# Patient Record
Sex: FEMALE
[De-identification: names, ages, dates, MRNs, and addresses within clinical notes are randomized per-mention and may not be internally consistent; named-entity substitution may affect disease eponyms.]

---

## 2020-08-20 ENCOUNTER — APPOINTMENT (OUTPATIENT)
Dept: RHEUMATOLOGY | Facility: CLINIC | Age: 55
End: 2020-08-20
Payer: COMMERCIAL

## 2020-08-20 VITALS
DIASTOLIC BLOOD PRESSURE: 64 MMHG | HEART RATE: 65 BPM | OXYGEN SATURATION: 99 % | SYSTOLIC BLOOD PRESSURE: 107 MMHG | TEMPERATURE: 99 F | HEIGHT: 65 IN | WEIGHT: 117 LBS | BODY MASS INDEX: 19.49 KG/M2

## 2020-08-20 PROCEDURE — 99203 OFFICE O/P NEW LOW 30 MIN: CPT

## 2020-08-20 NOTE — ASSESSMENT
[FreeTextEntry1] : 54 year old female presents today for an initial evaluation. Has 6 month hx of diffuse polyarthralgias, fatigue, poor sleep. Denies swelling to the joints. Symptoms worse with activity, at the end of the day. Not currently taking any medications for pain. Labs unremarkable. ROS and PE otherwise unremarkable for underlying classic CTD/ systemic autoimmune disease\par \par -NSAIDS/Tylenol prn for pain (reviewed risk and benefits of medications)\par -OTC topical analgesics\par -encouraged exercise and weight loss\par \par Discussed treatment plan with the patient. The patient was given the opportunity to ask questions and all questions were answered to their satisfaction.

## 2020-08-20 NOTE — PHYSICAL EXAM
[General Appearance - Alert] : alert [General Appearance - In No Acute Distress] : in no acute distress [General Appearance - Well Nourished] : well nourished [General Appearance - Well Developed] : well developed [PERRL With Normal Accommodation] : pupils were equal in size, round, and reactive to light [Sclera] : the sclera and conjunctiva were normal [Examination Of The Oral Cavity] : the lips and gums were normal [Extraocular Movements] : extraocular movements were intact [Outer Ear] : the ears and nose were normal in appearance [Oropharynx] : the oropharynx was normal [Neck Appearance] : the appearance of the neck was normal [Jugular Venous Distention Increased] : there was no jugular-venous distention [Heart Rate And Rhythm] : heart rate was normal and rhythm regular [Respiration, Rhythm And Depth] : normal respiratory rhythm and effort [Auscultation Breath Sounds / Voice Sounds] : lungs were clear to auscultation bilaterally [Heart Sounds] : normal S1 and S2 [Heart Sounds Gallop] : no gallops [Heart Sounds Pericardial Friction Rub] : no pericardial rub [Murmurs] : no murmurs [Bowel Sounds] : normal bowel sounds [Abdomen Soft] : soft [Abdomen Tenderness] : non-tender [No CVA Tenderness] : no ~M costovertebral angle tenderness [Abnormal Walk] : normal gait [Nail Clubbing] : no clubbing  or cyanosis of the fingernails [No Spinal Tenderness] : no spinal tenderness [Involuntary Movements] : no involuntary movements were seen [Motor Tone] : muscle strength and tone were normal [Musculoskeletal - Swelling] : no joint swelling seen [] : no rash [Skin Color & Pigmentation] : normal skin color and pigmentation [Skin Lesions] : no skin lesions [Sensation] : the sensory exam was normal to light touch and pinprick [Motor Exam] : the motor exam was normal [No Focal Deficits] : no focal deficits [Impaired Insight] : insight and judgment were intact [Oriented To Time, Place, And Person] : oriented to person, place, and time [Affect] : the affect was normal [Mood] : the mood was normal

## 2020-08-20 NOTE — HISTORY OF PRESENT ILLNESS
[FreeTextEntry1] : 54 year old female with PMH as listed below presents today for an initial evaluation\par \par Has 6 month hx of diffuse polyarthralgias, fatigue, poor sleep. Denies swelling to the joints. Symptoms described as dull/ aching sensation that starts in the morning and last the whole day. Symptoms worse with activity, at the end of the day. Not currently taking any medications for pain. \par \par Following neurology for migraine headaches. +raynauds to BL hands and feet\par Labs with LULU: negative, esr: 6, TSH/cbc/cmp: WNL\par \par denies fever, chills, weight loss, weight gain, denies dry eye,eye pain, eye redness, vision changes, dry mouth, oral ulcers, nasal congestion, difficulty swallowing,  denies ear pain, hearing changes, denies chest pain, chest palpitations, denies sob, blood in stool, denies dysuria, blood in the urine,  denies rashes, photosensitivity, hair loss, skin thickening, denies blood clots

## 2020-11-16 ENCOUNTER — APPOINTMENT (OUTPATIENT)
Dept: ENDOCRINOLOGY | Facility: CLINIC | Age: 55
End: 2020-11-16

## 2020-11-19 ENCOUNTER — APPOINTMENT (OUTPATIENT)
Dept: RHEUMATOLOGY | Facility: CLINIC | Age: 55
End: 2020-11-19
Payer: COMMERCIAL

## 2020-11-19 VITALS
SYSTOLIC BLOOD PRESSURE: 106 MMHG | DIASTOLIC BLOOD PRESSURE: 67 MMHG | BODY MASS INDEX: 19.49 KG/M2 | HEART RATE: 66 BPM | TEMPERATURE: 98.6 F | WEIGHT: 117 LBS | RESPIRATION RATE: 14 BRPM | OXYGEN SATURATION: 99 % | HEIGHT: 65 IN

## 2020-11-19 DIAGNOSIS — M79.10 MYALGIA, UNSPECIFIED SITE: ICD-10-CM

## 2020-11-19 DIAGNOSIS — R53.82 CHRONIC FATIGUE, UNSPECIFIED: ICD-10-CM

## 2020-11-19 DIAGNOSIS — M25.50 PAIN IN UNSPECIFIED JOINT: ICD-10-CM

## 2020-11-19 DIAGNOSIS — Z72.820 SLEEP DEPRIVATION: ICD-10-CM

## 2020-11-19 PROCEDURE — 99214 OFFICE O/P EST MOD 30 MIN: CPT

## 2020-11-19 RX ORDER — CYCLOBENZAPRINE HYDROCHLORIDE 5 MG/1
5 TABLET, FILM COATED ORAL
Qty: 30 | Refills: 1 | Status: ACTIVE | COMMUNITY
Start: 2020-11-19 | End: 1900-01-01

## 2020-11-19 NOTE — ASSESSMENT
[FreeTextEntry1] : 55 year old female presents today for an f.u visit. Has hx of diffuse polyarthralgias, myalgia, fatigue, poor sleep. Denies swelling to the joints. Symptoms worse with activity, at the end of the day. Labs unremarkable. ROS and PE otherwise unremarkable for underlying classic CTD/ systemic autoimmune disease.\par \par -will follow up on repeat pending labs \par -trail with muscle relaxer\par -NSAIDS/Tylenol prn for pain \par -OTC topical analgesics\par -encouraged proper diet, good sleep hygiene, exercise \par \par Discussed treatment plan with the patient. The patient was given the opportunity to ask questions and all questions were answered to their satisfaction.

## 2020-11-19 NOTE — HISTORY OF PRESENT ILLNESS
[FreeTextEntry1] : Pt presenting today for a f.u visit. Now reports to have increased myalgia, fatigue, diffuse polyarthralgia, poor sleep. Taking NSAIDS prn with some improvement in symptoms. \par Labs pending

## 2020-11-19 NOTE — PHYSICAL EXAM
[General Appearance - Alert] : alert [General Appearance - In No Acute Distress] : in no acute distress [General Appearance - Well Nourished] : well nourished [General Appearance - Well Developed] : well developed [Sclera] : the sclera and conjunctiva were normal [PERRL With Normal Accommodation] : pupils were equal in size, round, and reactive to light [Extraocular Movements] : extraocular movements were intact [Outer Ear] : the ears and nose were normal in appearance [Examination Of The Oral Cavity] : the lips and gums were normal [Oropharynx] : the oropharynx was normal [Neck Appearance] : the appearance of the neck was normal [Jugular Venous Distention Increased] : there was no jugular-venous distention [Respiration, Rhythm And Depth] : normal respiratory rhythm and effort [Auscultation Breath Sounds / Voice Sounds] : lungs were clear to auscultation bilaterally [Heart Rate And Rhythm] : heart rate was normal and rhythm regular [Heart Sounds] : normal S1 and S2 [Heart Sounds Gallop] : no gallops [Murmurs] : no murmurs [Heart Sounds Pericardial Friction Rub] : no pericardial rub [Bowel Sounds] : normal bowel sounds [Abdomen Soft] : soft [Abdomen Tenderness] : non-tender [No CVA Tenderness] : no ~M costovertebral angle tenderness [No Spinal Tenderness] : no spinal tenderness [Abnormal Walk] : normal gait [Nail Clubbing] : no clubbing  or cyanosis of the fingernails [Involuntary Movements] : no involuntary movements were seen [Musculoskeletal - Swelling] : no joint swelling seen [Motor Tone] : muscle strength and tone were normal [Skin Color & Pigmentation] : normal skin color and pigmentation [] : no rash [Skin Lesions] : no skin lesions [Sensation] : the sensory exam was normal to light touch and pinprick [Motor Exam] : the motor exam was normal [No Focal Deficits] : no focal deficits [Oriented To Time, Place, And Person] : oriented to person, place, and time [Impaired Insight] : insight and judgment were intact [Affect] : the affect was normal [Mood] : the mood was normal [FreeTextEntry1] : +diffuse paraspinal tenderness

## 2020-12-02 RX ORDER — DULOXETINE HYDROCHLORIDE 20 MG/1
20 CAPSULE, DELAYED RELEASE PELLETS ORAL
Qty: 90 | Refills: 0 | Status: DISCONTINUED | COMMUNITY
Start: 2020-09-01 | End: 2020-12-02

## 2020-12-02 RX ORDER — AMITRIPTYLINE HYDROCHLORIDE 10 MG/1
10 TABLET, FILM COATED ORAL
Qty: 30 | Refills: 2 | Status: ACTIVE | COMMUNITY
Start: 2020-12-02 | End: 1900-01-01

## 2022-09-06 ENCOUNTER — OFFICE (OUTPATIENT)
Dept: URBAN - METROPOLITAN AREA CLINIC 109 | Facility: CLINIC | Age: 57
Setting detail: OPHTHALMOLOGY
End: 2022-09-06
Payer: COMMERCIAL

## 2022-09-06 DIAGNOSIS — H25.13: ICD-10-CM

## 2022-09-06 DIAGNOSIS — H40.033: ICD-10-CM

## 2022-09-06 DIAGNOSIS — H52.4: ICD-10-CM

## 2022-09-06 PROBLEM — H52.7 REFRACTIVE ERROR: Status: ACTIVE | Noted: 2022-09-06

## 2022-09-06 PROCEDURE — 92004 COMPRE OPH EXAM NEW PT 1/>: CPT | Performed by: OPTOMETRIST

## 2022-09-06 PROCEDURE — 92015 DETERMINE REFRACTIVE STATE: CPT | Performed by: OPTOMETRIST

## 2022-09-06 ASSESSMENT — REFRACTION_CURRENTRX
OD_SPHERE: +0.25
OS_OVR_VA: 20/
OD_CYLINDER: -0.25
OD_SPHERE: +1.50
OS_SPHERE: +1.25
OS_AXIS: 81
OS_CYLINDER: -0.50
OS_OVR_VA: 20/
OD_OVR_VA: 20/
OD_AXIS: 89
OS_AXIS: 86
OD_CYLINDER: -0.50
OD_OVR_VA: 20/
OS_SPHERE: +0.25
OS_CYLINDER: -0.50
OD_AXIS: 93

## 2022-09-06 ASSESSMENT — TONOMETRY
OS_IOP_MMHG: 15
OD_IOP_MMHG: 15

## 2022-09-06 ASSESSMENT — REFRACTION_MANIFEST
OD_CYLINDER: SPH
OS_ADD: +2.25
OD_ADD: +2.25
OD_SPHERE: +0.25
OS_SPHERE: PLANO
OU_VA: 20/20
OS_VA1: 20/20
OD_VA1: 20/20

## 2022-09-06 ASSESSMENT — REFRACTION_AUTOREFRACTION
OS_CYLINDER: -0.25
OD_CYLINDER: -0.25
OD_SPHERE: +0.50
OS_SPHERE: +0.25
OS_AXIS: 106
OD_AXIS: 95

## 2022-09-06 ASSESSMENT — VISUAL ACUITY
OS_BCVA: 20/20
OD_BCVA: 20/20-1

## 2022-09-06 ASSESSMENT — CONFRONTATIONAL VISUAL FIELD TEST (CVF)
OD_FINDINGS: FULL
OS_FINDINGS: FULL

## 2022-09-06 ASSESSMENT — SPHEQUIV_DERIVED
OD_SPHEQUIV: 0.375
OS_SPHEQUIV: 0.125

## 2023-06-30 ENCOUNTER — OFFICE (OUTPATIENT)
Dept: URBAN - METROPOLITAN AREA CLINIC 109 | Facility: CLINIC | Age: 58
Setting detail: OPHTHALMOLOGY
End: 2023-06-30
Payer: COMMERCIAL

## 2023-06-30 DIAGNOSIS — H52.7: ICD-10-CM

## 2023-06-30 DIAGNOSIS — H25.13: ICD-10-CM

## 2023-06-30 DIAGNOSIS — H40.033: ICD-10-CM

## 2023-06-30 PROCEDURE — 99213 OFFICE O/P EST LOW 20 MIN: CPT | Performed by: OPHTHALMOLOGY

## 2023-06-30 PROCEDURE — 92020 GONIOSCOPY: CPT | Performed by: OPHTHALMOLOGY

## 2023-06-30 ASSESSMENT — REFRACTION_CURRENTRX
OS_AXIS: 81
OS_CYLINDER: -0.50
OD_OVR_VA: 20/
OS_AXIS: 86
OS_SPHERE: +1.25
OD_SPHERE: +0.25
OD_AXIS: 89
OD_AXIS: 93
OS_SPHERE: +0.25
OD_CYLINDER: -0.25
OS_OVR_VA: 20/
OD_CYLINDER: -0.50
OS_CYLINDER: -0.50
OS_OVR_VA: 20/
OD_OVR_VA: 20/
OD_SPHERE: +1.50

## 2023-06-30 ASSESSMENT — REFRACTION_MANIFEST
OS_VA1: 20/20
OS_ADD: +2.25
OU_VA: 20/20
OD_SPHERE: +0.25
OD_VA1: 20/20
OD_CYLINDER: SPH
OD_ADD: +2.25
OS_SPHERE: PLANO

## 2023-06-30 ASSESSMENT — VISUAL ACUITY
OD_BCVA: 20/20
OS_BCVA: 20/20-1

## 2023-06-30 ASSESSMENT — TONOMETRY
OD_IOP_MMHG: 12
OS_IOP_MMHG: 12

## 2023-06-30 ASSESSMENT — REFRACTION_AUTOREFRACTION
OD_AXIS: 95
OD_CYLINDER: -0.25
OS_AXIS: 106
OD_SPHERE: +0.50
OS_CYLINDER: -0.25
OS_SPHERE: +0.25

## 2023-06-30 ASSESSMENT — CONFRONTATIONAL VISUAL FIELD TEST (CVF)
OD_FINDINGS: FULL
OS_FINDINGS: FULL

## 2023-06-30 ASSESSMENT — SPHEQUIV_DERIVED
OS_SPHEQUIV: 0.125
OD_SPHEQUIV: 0.375

## 2023-07-28 ENCOUNTER — OFFICE (OUTPATIENT)
Dept: URBAN - METROPOLITAN AREA CLINIC 109 | Facility: CLINIC | Age: 58
Setting detail: OPHTHALMOLOGY
End: 2023-07-28
Payer: COMMERCIAL

## 2023-07-28 DIAGNOSIS — H02.832: ICD-10-CM

## 2023-07-28 DIAGNOSIS — H40.033: ICD-10-CM

## 2023-07-28 DIAGNOSIS — H02.835: ICD-10-CM

## 2023-07-28 DIAGNOSIS — H02.834: ICD-10-CM

## 2023-07-28 DIAGNOSIS — H52.7: ICD-10-CM

## 2023-07-28 DIAGNOSIS — H02.831: ICD-10-CM

## 2023-07-28 DIAGNOSIS — H25.13: ICD-10-CM

## 2023-07-28 PROCEDURE — 92014 COMPRE OPH EXAM EST PT 1/>: CPT | Performed by: OPHTHALMOLOGY

## 2023-07-28 PROCEDURE — 92020 GONIOSCOPY: CPT | Performed by: OPHTHALMOLOGY

## 2023-07-28 ASSESSMENT — REFRACTION_CURRENTRX
OD_AXIS: 89
OD_CYLINDER: -0.50
OD_OVR_VA: 20/
OD_CYLINDER: -0.25
OS_OVR_VA: 20/
OS_OVR_VA: 20/
OS_AXIS: 86
OD_OVR_VA: 20/
OD_AXIS: 93
OS_SPHERE: +1.25
OS_SPHERE: +0.25
OD_SPHERE: +1.50
OD_SPHERE: +0.25
OS_CYLINDER: -0.50
OS_AXIS: 81
OS_CYLINDER: -0.50

## 2023-07-28 ASSESSMENT — SPHEQUIV_DERIVED
OD_SPHEQUIV: 0.375
OS_SPHEQUIV: 0.125

## 2023-07-28 ASSESSMENT — REFRACTION_AUTOREFRACTION
OS_SPHERE: +0.25
OD_AXIS: 95
OS_AXIS: 106
OD_SPHERE: +0.50
OS_CYLINDER: -0.25
OD_CYLINDER: -0.25

## 2023-07-28 ASSESSMENT — REFRACTION_MANIFEST
OU_VA: 20/20
OS_SPHERE: PLANO
OD_VA1: 20/20
OD_SPHERE: +0.25
OD_CYLINDER: SPH
OS_ADD: +2.25
OS_VA1: 20/20
OD_ADD: +2.25

## 2023-07-28 ASSESSMENT — TONOMETRY
OD_IOP_MMHG: 17
OS_IOP_MMHG: 17

## 2023-07-28 ASSESSMENT — VISUAL ACUITY
OS_BCVA: 20/20-1
OD_BCVA: 20/20

## 2023-07-28 ASSESSMENT — CONFRONTATIONAL VISUAL FIELD TEST (CVF)
OD_FINDINGS: FULL
OS_FINDINGS: FULL

## 2023-07-28 ASSESSMENT — LID POSITION - DERMATOCHALASIS
OS_DERMATOCHALASIS: LLL LUL 1+
OD_DERMATOCHALASIS: RLL RUL 1+

## 2024-07-19 ENCOUNTER — OFFICE (OUTPATIENT)
Dept: URBAN - METROPOLITAN AREA CLINIC 109 | Facility: CLINIC | Age: 59
Setting detail: OPHTHALMOLOGY
End: 2024-07-19
Payer: COMMERCIAL

## 2024-07-19 ENCOUNTER — RX ONLY (RX ONLY)
Age: 59
End: 2024-07-19

## 2024-07-19 DIAGNOSIS — H00.025: ICD-10-CM

## 2024-07-19 DIAGNOSIS — H02.834: ICD-10-CM

## 2024-07-19 DIAGNOSIS — H02.831: ICD-10-CM

## 2024-07-19 DIAGNOSIS — H02.835: ICD-10-CM

## 2024-07-19 DIAGNOSIS — H02.832: ICD-10-CM

## 2024-07-19 PROCEDURE — 99213 OFFICE O/P EST LOW 20 MIN: CPT | Performed by: OPHTHALMOLOGY

## 2024-07-19 ASSESSMENT — LID POSITION - DERMATOCHALASIS
OD_DERMATOCHALASIS: RLL RUL 1+
OS_DERMATOCHALASIS: LLL LUL 1+

## 2024-07-19 ASSESSMENT — CONFRONTATIONAL VISUAL FIELD TEST (CVF)
OS_FINDINGS: FULL
OD_FINDINGS: FULL

## 2024-07-29 ENCOUNTER — OFFICE (OUTPATIENT)
Dept: URBAN - METROPOLITAN AREA CLINIC 109 | Facility: CLINIC | Age: 59
Setting detail: OPHTHALMOLOGY
End: 2024-07-29
Payer: COMMERCIAL

## 2024-07-29 DIAGNOSIS — H25.13: ICD-10-CM

## 2024-07-29 DIAGNOSIS — H00.15: ICD-10-CM

## 2024-07-29 DIAGNOSIS — H40.033: ICD-10-CM

## 2024-07-29 PROCEDURE — 92014 COMPRE OPH EXAM EST PT 1/>: CPT | Performed by: OPHTHALMOLOGY

## 2024-07-29 PROCEDURE — 92020 GONIOSCOPY: CPT | Performed by: OPHTHALMOLOGY

## 2024-07-29 ASSESSMENT — CONFRONTATIONAL VISUAL FIELD TEST (CVF)
OS_FINDINGS: FULL
OD_FINDINGS: FULL

## 2024-07-29 ASSESSMENT — LID POSITION - DERMATOCHALASIS
OD_DERMATOCHALASIS: RLL RUL 1+
OS_DERMATOCHALASIS: LLL LUL 1+

## 2025-07-16 ENCOUNTER — OFFICE (OUTPATIENT)
Dept: URBAN - METROPOLITAN AREA CLINIC 109 | Facility: CLINIC | Age: 60
Setting detail: OPHTHALMOLOGY
End: 2025-07-16
Payer: COMMERCIAL

## 2025-07-16 DIAGNOSIS — H02.832: ICD-10-CM

## 2025-07-16 DIAGNOSIS — H02.831: ICD-10-CM

## 2025-07-16 DIAGNOSIS — H40.033: ICD-10-CM

## 2025-07-16 DIAGNOSIS — H25.13: ICD-10-CM

## 2025-07-16 PROBLEM — H02.834 DERMATOCHALASIS; RIGHT UPPER LID, RIGHT LOWER LID, LEFT UPPER LID, LEFT LOWER LID: Status: ACTIVE | Noted: 2025-07-16

## 2025-07-16 PROBLEM — H02.835 DERMATOCHALASIS; RIGHT UPPER LID, RIGHT LOWER LID, LEFT UPPER LID, LEFT LOWER LID: Status: ACTIVE | Noted: 2025-07-16

## 2025-07-16 PROCEDURE — 92020 GONIOSCOPY: CPT | Performed by: OPHTHALMOLOGY

## 2025-07-16 PROCEDURE — 92014 COMPRE OPH EXAM EST PT 1/>: CPT | Performed by: OPHTHALMOLOGY

## 2025-07-16 ASSESSMENT — LID POSITION - DERMATOCHALASIS
OD_DERMATOCHALASIS: RLL RUL 1+
OS_DERMATOCHALASIS: LLL LUL 1+

## 2025-07-16 ASSESSMENT — REFRACTION_CURRENTRX
OD_SPHERE: +0.25
OS_CYLINDER: -0.50
OS_AXIS: 81
OS_OVR_VA: 20/
OD_AXIS: 93
OD_CYLINDER: -0.25
OD_OVR_VA: 20/
OS_SPHERE: +0.25
OD_CYLINDER: -0.50
OS_OVR_VA: 20/
OD_AXIS: 89
OD_OVR_VA: 20/
OS_AXIS: 86
OS_CYLINDER: -0.50
OS_SPHERE: +1.25
OD_SPHERE: +1.50

## 2025-07-16 ASSESSMENT — REFRACTION_AUTOREFRACTION
OS_AXIS: 82
OD_AXIS: 88
OD_SPHERE: +0.50
OD_CYLINDER: -0.50
OS_SPHERE: +0.25
OS_CYLINDER: -1.00

## 2025-07-16 ASSESSMENT — CONFRONTATIONAL VISUAL FIELD TEST (CVF)
OD_FINDINGS: FULL
OS_FINDINGS: FULL

## 2025-07-16 ASSESSMENT — KERATOMETRY
OD_K2POWER_DIOPTERS: 48.00
OS_K2POWER_DIOPTERS: 47.50
OS_AXISANGLE_DEGREES: 108
OD_K1POWER_DIOPTERS: 47.25
OD_AXISANGLE_DEGREES: 080
OS_K1POWER_DIOPTERS: 46.75

## 2025-07-16 ASSESSMENT — VISUAL ACUITY
OD_BCVA: 20/25-1
OS_BCVA: 20/25-1

## 2025-07-16 ASSESSMENT — REFRACTION_MANIFEST
OU_VA: 20/20
OS_VA1: 20/20
OS_ADD: +2.25
OD_SPHERE: +0.25
OD_ADD: +2.25
OD_CYLINDER: SPH
OS_SPHERE: PLANO
OD_VA1: 20/20

## 2025-07-16 ASSESSMENT — TONOMETRY: OD_IOP_MMHG: 10
